# Patient Record
Sex: FEMALE | Race: OTHER | HISPANIC OR LATINO | Employment: FULL TIME | URBAN - METROPOLITAN AREA
[De-identification: names, ages, dates, MRNs, and addresses within clinical notes are randomized per-mention and may not be internally consistent; named-entity substitution may affect disease eponyms.]

---

## 2018-05-24 ENCOUNTER — OFFICE VISIT (OUTPATIENT)
Dept: FAMILY MEDICINE CLINIC | Facility: CLINIC | Age: 63
End: 2018-05-24
Payer: COMMERCIAL

## 2018-05-24 VITALS
SYSTOLIC BLOOD PRESSURE: 154 MMHG | HEIGHT: 59 IN | RESPIRATION RATE: 12 BRPM | TEMPERATURE: 98.4 F | DIASTOLIC BLOOD PRESSURE: 80 MMHG | BODY MASS INDEX: 26.61 KG/M2 | OXYGEN SATURATION: 98 % | WEIGHT: 132 LBS | HEART RATE: 63 BPM

## 2018-05-24 DIAGNOSIS — Z12.31 ENCOUNTER FOR SCREENING MAMMOGRAM FOR BREAST CANCER: Primary | ICD-10-CM

## 2018-05-24 DIAGNOSIS — Z13.1 SCREENING FOR DIABETES MELLITUS: ICD-10-CM

## 2018-05-24 DIAGNOSIS — Z12.11 ENCOUNTER FOR SCREENING COLONOSCOPY: ICD-10-CM

## 2018-05-24 DIAGNOSIS — K59.00 CONSTIPATION, UNSPECIFIED CONSTIPATION TYPE: ICD-10-CM

## 2018-05-24 PROCEDURE — 99203 OFFICE O/P NEW LOW 30 MIN: CPT | Performed by: FAMILY MEDICINE

## 2018-05-24 RX ORDER — POLYETHYLENE GLYCOL 3350 17 G/17G
17 POWDER, FOR SOLUTION ORAL DAILY
Qty: 14 EACH | Refills: 5 | Status: SHIPPED | OUTPATIENT
Start: 2018-05-24 | End: 2018-07-06 | Stop reason: SDUPTHER

## 2018-05-24 NOTE — PROGRESS NOTES
Assessment/Plan:    No problem-specific Assessment & Plan notes found for this encounter  Diagnoses and all orders for this visit:    Encounter for screening mammogram for breast cancer  -     Mammo screening bilateral w cad; Future  -     HEMOGLOBIN A1C W/ EAG ESTIMATION; Future  -     Lipid Panel with Direct LDL reflex; Future  -     Comprehensive metabolic panel; Future  -     TSH, 3rd generation with T4 reflex; Future  -     CBC and differential; Future    Constipation, unspecified constipation type  -     Ambulatory referral to Gastroenterology; Future  -     polyethylene glycol (MIRALAX) 17 g packet; Take 17 g by mouth daily  -     HEMOGLOBIN A1C W/ EAG ESTIMATION; Future  -     Lipid Panel with Direct LDL reflex; Future  -     Comprehensive metabolic panel; Future  -     TSH, 3rd generation with T4 reflex; Future  -     CBC and differential; Future    Encounter for screening colonoscopy  -     Ambulatory referral to Gastroenterology; Future  -     HEMOGLOBIN A1C W/ EAG ESTIMATION; Future  -     Lipid Panel with Direct LDL reflex; Future  -     Comprehensive metabolic panel; Future  -     TSH, 3rd generation with T4 reflex; Future  -     CBC and differential; Future    Screening for diabetes mellitus  -     HEMOGLOBIN A1C W/ EAG ESTIMATION; Future  -     Lipid Panel with Direct LDL reflex; Future  -     Comprehensive metabolic panel; Future  -     TSH, 3rd generation with T4 reflex; Future  -     CBC and differential; Future        get labs  Fu  6 mo     Subjective:   Pt here for an initial visit  Complaining of upset/gassy feeling when eating daily and red meats, pt has tried OTC meds for gases not helping     Patient ID: Jose A Clement is a 70 y o  female  HPI   New pt  Presents to establish care, she transfer from [facility], [place] but per my chart review she has not been seen there since 2015  HS of PND, headaches ( neg head ct in 2015)       She has been experiencing gasses overtime she eats  She also feels bloated  Denies pain or cramps or any other GI sxs  She admits sometime constipations, She has to be constantly be eating fruits  She only drinks one bottle of water a day  She takes no medicines  The following portions of the patient's history were reviewed and updated as appropriate: allergies, current medications, past family history, past medical history, past social history, past surgical history and problem list     Review of Systems   Constitutional: Negative for activity change and appetite change  Respiratory: Negative  Cardiovascular: Negative  Gastrointestinal: Negative  Genitourinary: Negative  Musculoskeletal: Negative for arthralgias  Skin: Negative for rash  Psychiatric/Behavioral: Negative  Objective:      /80   Pulse 63   Temp 98 4 °F (36 9 °C)   Resp 12   Ht 4' 11 06" (1 5 m)   Wt 59 9 kg (132 lb)   SpO2 98%   BMI 26 61 kg/m²          Physical Exam   Constitutional: She is oriented to person, place, and time  She appears well-developed and well-nourished  No distress  HENT:   Head: Normocephalic  Eyes: Conjunctivae are normal    Cardiovascular: Normal rate, regular rhythm and normal heart sounds  Pulmonary/Chest: Effort normal and breath sounds normal  No respiratory distress  She has no wheezes  She has no rales  Abdominal: Soft  Bowel sounds are normal  She exhibits no distension  There is no tenderness  Musculoskeletal: Normal range of motion  She exhibits no edema  Neurological: She is alert and oriented to person, place, and time  Psychiatric: She has a normal mood and affect   Her behavior is normal

## 2018-06-09 ENCOUNTER — HOSPITAL ENCOUNTER (OUTPATIENT)
Dept: RADIOLOGY | Age: 63
Discharge: HOME | End: 2018-06-09
Payer: COMMERCIAL

## 2018-06-09 ENCOUNTER — APPOINTMENT (OUTPATIENT)
Dept: LAB | Age: 63
End: 2018-06-09
Payer: COMMERCIAL

## 2018-06-09 DIAGNOSIS — K59.00 CONSTIPATION, UNSPECIFIED CONSTIPATION TYPE: ICD-10-CM

## 2018-06-09 DIAGNOSIS — Z12.11 ENCOUNTER FOR SCREENING COLONOSCOPY: ICD-10-CM

## 2018-06-09 DIAGNOSIS — Z13.1 SCREENING FOR DIABETES MELLITUS: ICD-10-CM

## 2018-06-09 DIAGNOSIS — Z12.31 ENCOUNTER FOR SCREENING MAMMOGRAM FOR BREAST CANCER: ICD-10-CM

## 2018-06-09 LAB
ALBUMIN SERPL BCP-MCNC: 3.9 G/DL (ref 3.5–5)
ALP SERPL-CCNC: 76 U/L (ref 46–116)
ALT SERPL W P-5'-P-CCNC: 20 U/L (ref 12–78)
ANION GAP SERPL CALCULATED.3IONS-SCNC: 5 MMOL/L (ref 4–13)
AST SERPL W P-5'-P-CCNC: 21 U/L (ref 5–45)
BASOPHILS # BLD AUTO: 0.04 THOUSANDS/ΜL (ref 0–0.1)
BASOPHILS NFR BLD AUTO: 1 % (ref 0–1)
BILIRUB SERPL-MCNC: 0.49 MG/DL (ref 0.2–1)
BUN SERPL-MCNC: 14 MG/DL (ref 5–25)
CALCIUM SERPL-MCNC: 9.1 MG/DL (ref 8.3–10.1)
CHLORIDE SERPL-SCNC: 107 MMOL/L (ref 100–108)
CHOLEST SERPL-MCNC: 214 MG/DL (ref 50–200)
CO2 SERPL-SCNC: 29 MMOL/L (ref 21–32)
CREAT SERPL-MCNC: 0.69 MG/DL (ref 0.6–1.3)
EOSINOPHIL # BLD AUTO: 0.1 THOUSAND/ΜL (ref 0–0.61)
EOSINOPHIL NFR BLD AUTO: 2 % (ref 0–6)
ERYTHROCYTE [DISTWIDTH] IN BLOOD BY AUTOMATED COUNT: 12.5 % (ref 11.6–15.1)
EST. AVERAGE GLUCOSE BLD GHB EST-MCNC: 120 MG/DL
GFR SERPL CREATININE-BSD FRML MDRD: 92 ML/MIN/1.73SQ M
GLUCOSE P FAST SERPL-MCNC: 87 MG/DL (ref 65–99)
HBA1C MFR BLD: 5.8 % (ref 4.2–6.3)
HCT VFR BLD AUTO: 42.6 % (ref 34.8–46.1)
HDLC SERPL-MCNC: 60 MG/DL (ref 40–60)
HGB BLD-MCNC: 14.1 G/DL (ref 11.5–15.4)
IMM GRANULOCYTES # BLD AUTO: 0.01 THOUSAND/UL (ref 0–0.2)
IMM GRANULOCYTES NFR BLD AUTO: 0 % (ref 0–2)
LDLC SERPL CALC-MCNC: 133 MG/DL (ref 0–100)
LYMPHOCYTES # BLD AUTO: 1.78 THOUSANDS/ΜL (ref 0.6–4.47)
LYMPHOCYTES NFR BLD AUTO: 39 % (ref 14–44)
MCH RBC QN AUTO: 29.4 PG (ref 26.8–34.3)
MCHC RBC AUTO-ENTMCNC: 33.1 G/DL (ref 31.4–37.4)
MCV RBC AUTO: 89 FL (ref 82–98)
MONOCYTES # BLD AUTO: 0.33 THOUSAND/ΜL (ref 0.17–1.22)
MONOCYTES NFR BLD AUTO: 7 % (ref 4–12)
NEUTROPHILS # BLD AUTO: 2.33 THOUSANDS/ΜL (ref 1.85–7.62)
NEUTS SEG NFR BLD AUTO: 51 % (ref 43–75)
NRBC BLD AUTO-RTO: 0 /100 WBCS
PLATELET # BLD AUTO: 249 THOUSANDS/UL (ref 149–390)
PMV BLD AUTO: 9.7 FL (ref 8.9–12.7)
POTASSIUM SERPL-SCNC: 3.6 MMOL/L (ref 3.5–5.3)
PROT SERPL-MCNC: 7.7 G/DL (ref 6.4–8.2)
RBC # BLD AUTO: 4.8 MILLION/UL (ref 3.81–5.12)
SODIUM SERPL-SCNC: 141 MMOL/L (ref 136–145)
TRIGL SERPL-MCNC: 104 MG/DL
TSH SERPL DL<=0.05 MIU/L-ACNC: 2.15 UIU/ML (ref 0.36–3.74)
WBC # BLD AUTO: 4.59 THOUSAND/UL (ref 4.31–10.16)

## 2018-06-09 PROCEDURE — 80061 LIPID PANEL: CPT

## 2018-06-09 PROCEDURE — 83036 HEMOGLOBIN GLYCOSYLATED A1C: CPT

## 2018-06-09 PROCEDURE — 77067 SCR MAMMO BI INCL CAD: CPT

## 2018-06-09 PROCEDURE — 36415 COLL VENOUS BLD VENIPUNCTURE: CPT

## 2018-06-09 PROCEDURE — 85025 COMPLETE CBC W/AUTO DIFF WBC: CPT

## 2018-06-09 PROCEDURE — 84443 ASSAY THYROID STIM HORMONE: CPT

## 2018-06-09 PROCEDURE — 80053 COMPREHEN METABOLIC PANEL: CPT

## 2018-07-06 ENCOUNTER — OFFICE VISIT (OUTPATIENT)
Dept: GASTROENTEROLOGY | Facility: MEDICAL CENTER | Age: 63
End: 2018-07-06
Payer: COMMERCIAL

## 2018-07-06 VITALS
HEART RATE: 61 BPM | DIASTOLIC BLOOD PRESSURE: 82 MMHG | WEIGHT: 134 LBS | HEIGHT: 59 IN | TEMPERATURE: 97.7 F | SYSTOLIC BLOOD PRESSURE: 136 MMHG | BODY MASS INDEX: 27.01 KG/M2

## 2018-07-06 DIAGNOSIS — R10.84 GENERALIZED ABDOMINAL PAIN: ICD-10-CM

## 2018-07-06 DIAGNOSIS — K59.00 CONSTIPATION, UNSPECIFIED CONSTIPATION TYPE: ICD-10-CM

## 2018-07-06 DIAGNOSIS — K21.9 GASTROESOPHAGEAL REFLUX DISEASE WITHOUT ESOPHAGITIS: Primary | ICD-10-CM

## 2018-07-06 DIAGNOSIS — Z86.010 HISTORY OF COLON POLYPS: ICD-10-CM

## 2018-07-06 DIAGNOSIS — Z12.11 ENCOUNTER FOR SCREENING COLONOSCOPY: ICD-10-CM

## 2018-07-06 DIAGNOSIS — R13.19 ESOPHAGEAL DYSPHAGIA: ICD-10-CM

## 2018-07-06 PROBLEM — Z86.0100 HISTORY OF COLON POLYPS: Status: ACTIVE | Noted: 2018-07-06

## 2018-07-06 PROCEDURE — 99244 OFF/OP CNSLTJ NEW/EST MOD 40: CPT | Performed by: INTERNAL MEDICINE

## 2018-07-06 RX ORDER — POLYETHYLENE GLYCOL 3350, SODIUM SULFATE, SODIUM CHLORIDE, POTASSIUM CHLORIDE, ASCORBIC ACID, SODIUM ASCORBATE 7.5-2.691G
2000 KIT ORAL ONCE
Qty: 2000 ML | Refills: 0 | Status: SHIPPED | OUTPATIENT
Start: 2018-07-06 | End: 2018-07-19

## 2018-07-06 RX ORDER — POLYETHYLENE GLYCOL 3350 17 G/17G
17 POWDER, FOR SOLUTION ORAL 2 TIMES DAILY
Qty: 60 EACH | Refills: 5 | Status: SHIPPED | OUTPATIENT
Start: 2018-07-06 | End: 2018-07-19

## 2018-07-06 NOTE — PROGRESS NOTES
[facility] - Outpatient Consultation  [facility] 70 [facility] MR[facility]: [id number]  Encounter: [id number]          ASSESSMENT AND PLAN:      1  Gastroesophageal reflux disease without esophagitis  2  Esophageal dysphagia  3  Generalized abdominal pain    I will schedule her for an upper endoscopy to evaluate her reflux and dysphagia and abdominal pain  She may have reflux esophagitis or a peptic stricture or Schatzki ring  Even if it appears endoscopically normal I will take random biopsies of the esophagus to rule out eosinophilic esophagitis and of the stomach to rule out Helicobacter pylori infection   - Case request operating room: EGD    4  Constipation, unspecified constipation type  5  Encounter for screening colonoscopy  6  History of colon polyps    I have asked her to continue taking the MiraLax twice a day for her constipation I will schedule her for colonoscopy since her last was about five years ago and given her history of colon polyps  - Case request operating room:  COLONOSCOPY  - MOVIPREP 100 g; Take 2,000 mL by mouth once for 1 dose  Dispense: 2000 mL; Refill: 0    ______________________________________________________________________    HPI:  She presents for evaluation of multiple gastrointestinal complaints  He he she has chronic reflux, abdominal pain, dysphagia, and constipation  Because of the symptoms, she was referred for an endoscopy and colonoscopy  She believes her last procedures were done about five years ago and that they were both unremarkable  She does have a prior history of colon polyps  She denies any family history of colon polyps or colon cancer  She has been taking MiraLax twice a day and feels it is helping  REVIEW OF SYSTEMS:    CONSTITUTIONAL: Denies any fever, chills, rigors, and weight loss  HEENT: No earache or tinnitus  Denies hearing loss or visual disturbances    CARDIOVASCULAR: No palpitations, but has occasional chest pain when cold  RESPIRATORY: Denies any cough, hemoptysis, shortness of breath or dyspnea on exertion  GASTROINTESTINAL: As noted in the History of Present Illness  GENITOURINARY: No problems with urination  Denies any hematuria or dysuria  NEUROLOGIC: No dizziness or vertigo, denies headaches  MUSCULOSKELETAL: Denies any muscle or joint pain  SKIN: Denies skin rashes or itching  ENDOCRINE: Denies excessive thirst  Denies intolerance to heat or cold  PSYCHOSOCIAL: Denies depression or anxiety  Denies any recent memory loss  Historical Information   Past Medical History:   Diagnosis Date    Frequent headaches      Past Surgical History:   Procedure Laterality Date     SECTION       Social History   History   Alcohol Use No     History   Drug Use No     History   Smoking Status    Never Smoker   Smokeless Tobacco    Never Used     No family history on file  Meds/Allergies       Current Outpatient Prescriptions:     polyethylene glycol (MIRALAX) 17 g packet    MOVIPREP 100 g    No Known Allergies        Objective     Blood pressure 136/82, pulse 61, temperature 97 7 °F (36 5 °C), temperature source Tympanic, height 4' 11 06" (1 5 m), weight 60 8 kg (134 lb)  Body mass index is 27 01 kg/m²  PHYSICAL EXAM:      General Appearance:   Alert, cooperative, no distress   HEENT:   Normocephalic, atraumatic, anicteric      Neck:  Supple, symmetrical, trachea midline   Lungs:   Clear to auscultation bilaterally; no rales, rhonchi or wheezing; respirations unlabored    Heart[de-identified]   Regular rate and rhythm; no murmur, rub, or gallop     Abdomen:   Soft, periumbilical tenderness, non-distended; normal bowel sounds; no masses, no organomegaly    Genitalia:   Deferred    Rectal:   Deferred    Extremities:  No cyanosis, clubbing or edema    Pulses:  2+ and symmetric    Skin:  No jaundice, rashes, or lesions    Lymph nodes:  No palpable cervical lymphadenopathy        Lab Results:   No visits with results within 1 Day(s) from this visit  Latest known visit with results is:   Appointment on 06/09/2018   Component Date Value    Hemoglobin A1C 06/09/2018 5 8     EAG 06/09/2018 120     Cholesterol 06/09/2018 214*    Triglycerides 06/09/2018 104     HDL, Direct 06/09/2018 60     LDL Calculated 06/09/2018 133*    Sodium 06/09/2018 141     Potassium 06/09/2018 3 6     Chloride 06/09/2018 107     CO2 06/09/2018 29     Anion Gap 06/09/2018 5     BUN 06/09/2018 14     Creatinine 06/09/2018 0 69     Glucose, Fasting 06/09/2018 87     Calcium 06/09/2018 9 1     AST 06/09/2018 21     ALT 06/09/2018 20     Alkaline Phosphatase 06/09/2018 76     Total Protein 06/09/2018 7 7     Albumin 06/09/2018 3 9     Total Bilirubin 06/09/2018 0 49     eGFR 06/09/2018 92     TSH 3RD GENERATON 06/09/2018 2 150     WBC 06/09/2018 4 59     RBC 06/09/2018 4 80     Hemoglobin 06/09/2018 14 1     Hematocrit 06/09/2018 42 6     MCV 06/09/2018 89     MCH 06/09/2018 29 4     MCHC 06/09/2018 33 1     RDW 06/09/2018 12 5     MPV 06/09/2018 9 7     Platelets 20/76/9532 249     nRBC 06/09/2018 0     Neutrophils Relative 06/09/2018 51     Immat GRANS % 06/09/2018 0     Lymphocytes Relative 06/09/2018 39     Monocytes Relative 06/09/2018 7     Eosinophils Relative 06/09/2018 2     Basophils Relative 06/09/2018 1     Neutrophils Absolute 06/09/2018 2 33     Immature Grans Absolute 06/09/2018 0 01     Lymphocytes Absolute 06/09/2018 1 78     Monocytes Absolute 06/09/2018 0 33     Eosinophils Absolute 06/09/2018 0 10     Basophils Absolute 06/09/2018 0 04          Radiology Results:   Mammo Screening Bilateral W Cad    Result Date: 6/14/2018  Narrative: Patient History: Patient had first child after 25  No known family history of cancer  No Hormone Replacement Therapy Patient has never smoked  Patient's BMI is 26 7  Reason for exam: screening, asymptomatic   Screening Mammo Screening Bilateral W CAD: June 9, 2018 - Check In #: [de-identified] Bilateral CC and MLO view(s) were taken  Technologist: RT Maira(R)(M) Prior study comparison: May 6, 2015, mammo screening bilateral W CAD, performed at [facility]  April 30, 2014, mammo screening bilateral W CAD, performed at [facility]  June 24, 2013, mammo diagnostic right W CAD, performed at [facility]  May 1, 2013, US breast right, performed at [facility]  December 17, 2012, mammo diagnostic right W CAD, performed at [facility]  November 26, 2012, mammo screening bilateral W CAD, performed at [facility]  July 13, 2011, mammo screening bilateral W CAD, performed at [facility]  There are scattered fibroglandular densities  Small nodular asymmetry along the nipple line on the right cc view was present on exam from 2014 and has not significantly enlarged suggesting benign etiology  There are no spiculated masses on either side  There are no malignant calcifications  No skin thickening or nipple inversion or axillary adenopathy  ACR BI-RADS® Assessments: BiRad:2 - Benign Recommendation: Routine screening mammogram of both breasts in 1 year  Analyzed by CAD The patient is scheduled in a reminder system for screening mammography  8-10% of cancers will be missed on mammography  Management of a palpable abnormality must be based on clinical grounds  Patients will be notified of their results via letter from our facility  Accredited by [address] [place] [address] of Radiology and FDA   Transcription Location: [address] [place] [address] [place]: [id number] Risk Value(s): Tyrer-Cuzick 10 Year: 2 900%, Tyrer-Cuzick Lifetime: 6 000%, Myriad Table: 1 5%, DANIELLE 5 Year: 2 1%, NCI Lifetime: 7 8%

## 2018-07-06 NOTE — PATIENT INSTRUCTIONS
Pt was scheduled for a colon/egd at [facility] on 8/28/18 with Dr Nilda Castillo instructions were given to the pt and gone over by the MA

## 2018-07-06 NOTE — LETTER
July 6, 2018     Charisma Muñiz MD  [address]  [address]  [address] [place] [address]    Patient: Alanna Childers   YOB: 1955   Date of Visit: 7/6/2018       Dear Dr Raphael Lomas: Thank you for referring Alanna Childers to me for evaluation  Below are my notes for this consultation  If you have questions, please do not hesitate to call me  I look forward to following your patient along with you           Sincerely,        Verito Barrientos MD        CC: No Recipients

## 2018-07-10 DIAGNOSIS — Z00.00 HEALTH MAINTENANCE EXAMINATION: Primary | ICD-10-CM

## 2018-07-19 ENCOUNTER — OFFICE VISIT (OUTPATIENT)
Dept: URGENT CARE | Facility: MEDICAL CENTER | Age: 63
End: 2018-07-19
Payer: COMMERCIAL

## 2018-07-19 VITALS
DIASTOLIC BLOOD PRESSURE: 71 MMHG | SYSTOLIC BLOOD PRESSURE: 157 MMHG | OXYGEN SATURATION: 97 % | RESPIRATION RATE: 20 BRPM | HEIGHT: 64 IN | WEIGHT: 129 LBS | BODY MASS INDEX: 22.02 KG/M2 | HEART RATE: 62 BPM | TEMPERATURE: 97.8 F

## 2018-07-19 DIAGNOSIS — M79.651 RIGHT THIGH PAIN: ICD-10-CM

## 2018-07-19 DIAGNOSIS — H57.11 ACUTE RIGHT EYE PAIN: Primary | ICD-10-CM

## 2018-07-19 PROCEDURE — 99284 EMERGENCY DEPT VISIT MOD MDM: CPT | Performed by: FAMILY MEDICINE

## 2018-07-19 PROCEDURE — G0383 LEV 4 HOSP TYPE B ED VISIT: HCPCS | Performed by: FAMILY MEDICINE

## 2018-07-19 RX ORDER — CYCLOBENZAPRINE HCL 5 MG
10 TABLET ORAL 3 TIMES DAILY PRN
Qty: 30 TABLET | Refills: 0 | Status: SHIPPED | OUTPATIENT
Start: 2018-07-19 | End: 2019-03-18 | Stop reason: ALTCHOICE

## 2018-07-19 NOTE — PATIENT INSTRUCTIONS
Right and left thigh are injected  I suspect elevated pressure in the eyes  However unable to test ocular pressure  Patient be referred to [facility] for sight for ophthalmology consult  For right thigh pain, I prescribed cyclobenzaprine 5 mg q h s  p r n     Advised patient apply warm compress to affected area and to follow up with primary care provider  Dolor en el orlando  LO QUE NECESITA SABER:   El dolor de orlando podría ser a causa de problemas dentro de dean orlando  Un problema o condición en otra parte del cuerpo también puede provocar que el dolor se transmita al orlando  Algunas causas del dolor de orlando incluyen ojos resecos, inflamación, infección de sinusitis o cefaleas en brotes  INSTRUCCIONES SOBRE EL REN HOSPITALARIA:   Medicamentos:  Es posible que usted necesite alguno de los siguientes:  · Narcisa Nunnery artificiales  son gotas que pueden ayudar a humedecer ashli ojos y aliviar el dolor  Pregúntele a dean médico la frecuencia con la que usted debe usar las lágrimas artificiales  · AINEs (Analgésicos antiinflamatorios no esteroides) luis el ibuprofeno, ayudan a disminuir la inflamación, el dolor y la [place]  Brandee medicamento esta disponible con o sin ko receta médica  Los AINEs pueden causar sangrado estomacal o problemas renales en ciertas personas  Si usted esta tomando un anticoágulante,  siempre  pregunte si los AINEs son seguros para usted  Siempre nicholas la etiqueta de brandee medicamento y Felix Valenzuela instrucciones  No administre brandee medicamento a niños menores de 6 meses de deena sin antes obtener la autorización de dean médico      · [place] ashli medicamentos luis se le haya indicado  Consulte con dean médico si usted sonia que dean medicamento no le está ayudando o si presenta efectos secundarios  Infórmele si es alérgico a algún medicamento  Mantenga ko lista actualizada de los [place], las vitaminas y los productos herbales que avel   Incluya los siguientes datos de los medicamentos: cantidad, frecuencia y motivo de administración  Traiga con usted la lista o los envases de la píldoras a aniyah citas de seguimiento  Lleve la lista de los medicamentos con usted en gen de ko emergencia  Acuda a aniyah consultas de control con whittaker médico según le indicaron  A usted podrían referirlo a un especialista de los ojos  Anote aniyah preguntas para que se acuerde de hacerlas oscar aniyah visitas  Pregúntele a whittaker [employer] vitaminas y minerales son adecuados para usted  · Usted tiene fiebre  · Whittaker dolor de orlando empeora cuando usted mueve aniyah ojos  · Usted tiene preguntas o inquietudes acerca de whittaker condición o cuidado  Regrese a la baudilio de emergencias si:   · Usted tiene alguna pérdida de visión  · Usted tiene cambios en la visión repentinos, luis visión borrosa, doble o ve halos alrededignacio Bonilla  · Usted desarrolla un dolor de [place] severo  © 2017 26[address] Andrew Gomez Information is for End User's use only and may not be sold, redistributed or otherwise used for commercial purposes  All illustrations and images included in CareNotes® are the copyrighted property of A D A M , Inc  or Julio Cesar Smith  Esta información es sólo para uso en educación  Whittaker intención no es darle un consejo médico sobre enfermedades o tratamientos  Colsulte con whittaker [place] farmacéutico antes de seguir cualquier régimen médico para saber si es seguro y efectivo para usted  Dolor de piernas   LO QUE NECESITA SABER:   El dolor de piernas puede ser causado por ko variedad de afecciones de [place]  Aniyah exámenes no mostraron ningún hueso roto ni coágulos de [place]  INSTRUCCIONES SOBRE EL REN HOSPITALARIA:   Regrese a la baudilio de emergencias si:   · Usted tiene fiebre  · Whittaker pierna empieza a inflamarse    · Whittaker dolor de [place]  · Usted tiene entumecimiento o cosquilleo en la pierna o los dedos del pie  · Usted es incapaz de  o soportar nada de peso sobre whittaker pierna    Pregúntele a whittaker [employer] vitaminas y minerales son adecuados para usted  · [place] dolor no disminuye, aún después del [place]  · Usted tiene preguntas o inquietudes acerca de dean condición o cuidado  Medicamentos:   · AINEs (Analgésicos antiinflamatorios no esteroides) luis el ibuprofeno, ayudan a disminuir la inflamación, el dolor y la fiebre  Brandee medicamento esta disponible con o sin ko receta médica  Los AINEs pueden causar sangrado estomacal o problemas renales en ciertas personas  Si usted avel un medicamento anticoagulante, siempre pregúntele a dean médico si los MEERA son seguros para usted  Siempre nicholas la etiqueta de brandee medicamento y [place] instrucciones  · [place] ashli medicamentos luis se le haya indicado  Consulte con dean médico si usted sonia que dean medicamento no le está ayudando o si presenta efectos secundarios  Infórmele si es alérgico a algún medicamento  Mantenga ko lista actualizada de los [place], las vitaminas y los productos herbales que avel  Incluya los siguientes datos de los medicamentos: cantidad, frecuencia y motivo de administración  Traiga con usted la lista o los envases de la píldoras a ashli citas de seguimiento  Lleve la lista de los medicamentos con usted en gen de ko emergencia  Acuda a ashli consultas de control con dean médico según le indicaron  Es posible que necesite más exámenes para determinar la causa de dean dolor de pierna  Usted podría necesitar fisioterapia o consultar con un ortopedista especializado  Anote ashli preguntas para que se acuerde de hacerlas oscar ashli visitas  Lidiar con el dolor de pierna:   · Descanse  dean pierna para que se recupere  Es posible que necesite un inmovilizador, aparato ortopédico o férula para limitar el movimiento de la pierna  Posiblemente deba evitar poner peso sobre dean pierna oscar al menos 48 horas  Regrese a ashli actividades cotidianas según las indicaciones      · El hielo  al área afectada por 20 minutos cada 4 horas por un jennie de 24 horas o según las indicaciones  Use un paquete de hielo o ponga hielo molido dentro de [employer]  Cúbrala con ko toalla para proteger whittaker piel  El hielo ayuda a evitar daño al tejido y a disminuir la inflamación y el dolor  · Eleve  whittaker pierna lesionada por encima del nivel de whittaker corazón con la frecuencia que pueda  Foristell va a disminuir inflamación y el dolor  Apoye la pierna sobre almohadas o mantas para mantener el área elevada de ko forma cómoda, si es posible  · Use dispositivos de apoyo luis se le indique  Es posible que usted necesite usar un bastón o [place]  Los dispositivos de asistencia pueden ayudar a disminuir el dolor y la presión que se ejerce en whittaker pierna al caminar  Pregunte a whittaker médico por más [employer] dispositivos de asistencia y cómo se usan de forma correcta  · Mantenga un peso saludable  El peso corporal adicional puede provocar presión y dolor en las articulaciones de whittaker Lynnetta Imus y tobillo  Consulte con whittaker médico cuánto debería pesar  Pida que le ayude a crear un plan para bajar de peso si usted tiene sobrepeso  © 2017 2600 Andrew Gomez Information is for End User's use only and may not be sold, redistributed or otherwise used for commercial purposes  All illustrations and images included in CareNotes® are the copyrighted property of A D A M , Inc  or Julio Cesar Smith  Esta información es sólo para uso en educación  Whittaker intención no es darle un consejo médico sobre enfermedades o tratamientos  Colsulte con whittaker [place] farmacéutico antes de seguir cualquier régimen médico para saber si es seguro y efectivo para usted  Eye Pain   WHAT YOU NEED TO KNOW:   What causes eye pain? Eye pain may be caused by a problem within your eye  A problem or condition in another body area can also cause pain that travels to your eye   Eye pain may be caused by any of the following:  · Dry eyes     · An abrasion on your cornea (the surface of your eye) · A foreign body in your eye     · Inflammation of a nerve, gland, or muscle in your eye    · Certain types of glaucoma (increased pressure inside your eye that can cause vision loss)     · A sinus infection or jaw pain    · Headaches, including migraine or cluster headaches  How is the cause of eye pain diagnosed? Your healthcare provider will examine your eyes and ask when your pain began  He will also ask if you have other symptoms, such as sensitivity to light  Tell him if you ever had eye surgery or an eye injury  Tell him if you wear glasses or contact lenses  Also tell him the names of medicines you take, and if you have allergies or health conditions  You may need the following tests:  · A visual acuity test  checks your vision in both eyes  You will be asked to read letters and numbers from a chart  · A slit-lamp exam  uses a microscope to check every part of your eye for inflammation or injury  A dye may be used to look for damage to your cornea  · A fluorescein stain test  uses dye to show if you have a foreign body in your eye  It can also reveal damage to your cornea  · A tonometry test  measures the pressure inside your eye to check for glaucoma  Your healthcare provider will numb your eyes with eyedrops before he checks your eye pressure  How is eye pain treated? · Artificial tears  are eyedrops that can help moisturize your eyes and relieve your pain  Ask your healthcare provider how often to use artificial tears  · NSAIDs , such as ibuprofen, help decrease swelling, pain, and fever  This medicine is available with or without a doctor's order  NSAIDs can cause stomach bleeding or kidney problems in certain people  If you take blood thinner medicine, always ask if NSAIDs are safe for you  Always read the medicine label and follow directions  Do not give these medicines to children under 10months of age without direction from your child's healthcare provider    When should I contact my healthcare provider? · You have a fever  · Your eye pain gets worse when you move your eyes  · You have questions or concerns about your condition or care  When should I seek immediate care or call 911? · You have any vision loss  · You have sudden vision changes such as blurred vision, double vision, or seeing halos around lights  · You develop severe eye pain  CARE AGREEMENT:   You have the right to help plan your care  Learn about your health condition and how it may be treated  Discuss treatment options with your caregivers to decide what care you want to receive  You always have the right to refuse treatment  The above information is an  only  It is not intended as medical advice for individual conditions or treatments  Talk to your doctor, nurse or pharmacist before following any medical regimen to see if it is safe and effective for you  © 2017 2600 Andrew  Information is for End User's use only and may not be sold, redistributed or otherwise used for commercial purposes  All illustrations and images included in CareNotes® are the copyrighted property of A D A M , Inc  or Julio Cesar Smith

## 2018-07-19 NOTE — PROGRESS NOTES
[facility] [employer] [facility] [employer] [facility]        NAME: Daily Connor is a 70 y o  female  : 1955    MRN: [id number]  DATE: 2018  TIME: 6:03 PM    Assessment and Plan   Acute right eye pain [H57 11]  1  Acute right eye pain  Ambulatory referral to Ophthalmology   2  Right thigh pain  cyclobenzaprine (FLEXERIL) 5 mg tablet         Patient Instructions       Follow up with PCP in 3-5 days  Proceed to  ER if symptoms worsen  Chief Complaint     Chief Complaint   Patient presents with    Eye Pain     right eye pain no injury  right knee pain 1 week no injury         History of Present Illness       Patient here today because of 2 problems  First, she is complaining of severe right eye pain which began yesterday in the evening  It was also accompanied by intense redness of the eye  Denies any visual disturbance  Pain has diminished  She had similar problem 6 months ago  She informs me she has been seen by an optometrist 6 months ago refer to on ophthalmologist for unknown diagnosis  She cannot recall the diagnosis at that time  I asked her about glaucoma however she is not sure  Denies any nausea or vomiting or headache  She is also here today because of right knee pain for the past week  Denies any fall or trauma  Upon further questioning, pain is actually located in right right thigh  Denies any swelling or bruising  She found it difficult to bend her knee last week  She Has been applying topical ointment for her knee with some mild improvement  Review of Systems   Review of Systems   Constitutional: Negative  Eyes: Positive for pain and redness  Negative for photophobia and visual disturbance  Musculoskeletal: Positive for arthralgias and myalgias  Negative for joint swelling           Current Medications       Current Outpatient Prescriptions:     cyclobenzaprine (FLEXERIL) 5 mg tablet, Take 2 tablets (10 mg total) by mouth 3 (three) times a day as needed for muscle spasms, Disp: 30 tablet, Rfl: 0    Current Allergies     Allergies as of 2018    (No Known Allergies)            The following portions of the patient's history were reviewed and updated as appropriate: allergies, current medications, past family history, past medical history, past social history, past surgical history and problem list      Past Medical History:   Diagnosis Date    Frequent headaches        Past Surgical History:   Procedure Laterality Date     SECTION         No family history on file  Medications have been verified  Objective   /71   Pulse 62   Temp 97 8 °F (36 6 °C)   Resp 20   Ht 5' 4" (1 626 m)   Wt 58 5 kg (129 lb)   SpO2 97%   BMI 22 14 kg/m²        Physical Exam     Physical Exam   Eyes: EOM are normal  Pupils are equal, round, and reactive to light  Right and left thigh reveals injected sclera bilaterally  There is tenderness upon palpation over the right and left lobe  Funduscopic exam reveals no papilledema hemorrhage or exudate  Musculoskeletal: Normal range of motion  She exhibits tenderness  Right lower extremity reveals full range on flexion and extension at the knee  Lachman's test-negative  Kiko's test is negative  There is no evidence of peripatellar effusion  There is point tenderness of the distal right upper thigh with no evidence of ecchymosis or effusion  Leg strength bilaterally, good, 5/5  Nursing note and vitals reviewed

## 2018-08-08 ENCOUNTER — TELEPHONE (OUTPATIENT)
Dept: GASTROENTEROLOGY | Facility: CLINIC | Age: 63
End: 2018-08-08

## 2018-08-08 NOTE — TELEPHONE ENCOUNTER
Spoke to Sincere [place] @ [facility] authorization [phone] faxed clinicals   Pending [facility]#[id number]

## 2018-08-27 ENCOUNTER — ANESTHESIA EVENT (OUTPATIENT)
Dept: GASTROENTEROLOGY | Facility: MEDICAL CENTER | Age: 63
End: 2018-08-27
Payer: COMMERCIAL

## 2018-08-28 ENCOUNTER — HOSPITAL ENCOUNTER (OUTPATIENT)
Facility: MEDICAL CENTER | Age: 63
Setting detail: OUTPATIENT SURGERY
Discharge: HOME | End: 2018-08-28
Attending: INTERNAL MEDICINE | Admitting: INTERNAL MEDICINE
Payer: COMMERCIAL

## 2018-08-28 ENCOUNTER — ANESTHESIA (OUTPATIENT)
Dept: GASTROENTEROLOGY | Facility: MEDICAL CENTER | Age: 63
End: 2018-08-28
Payer: COMMERCIAL

## 2018-08-28 VITALS
RESPIRATION RATE: 16 BRPM | SYSTOLIC BLOOD PRESSURE: 106 MMHG | HEIGHT: 64 IN | TEMPERATURE: 97.9 F | DIASTOLIC BLOOD PRESSURE: 56 MMHG | BODY MASS INDEX: 20.83 KG/M2 | WEIGHT: 122 LBS | OXYGEN SATURATION: 96 % | HEART RATE: 62 BPM

## 2018-08-28 DIAGNOSIS — Z86.010 HISTORY OF COLON POLYPS: ICD-10-CM

## 2018-08-28 DIAGNOSIS — R10.84 GENERALIZED ABDOMINAL PAIN: ICD-10-CM

## 2018-08-28 DIAGNOSIS — R13.19 ESOPHAGEAL DYSPHAGIA: ICD-10-CM

## 2018-08-28 DIAGNOSIS — K21.9 GASTROESOPHAGEAL REFLUX DISEASE WITHOUT ESOPHAGITIS: ICD-10-CM

## 2018-08-28 DIAGNOSIS — K59.00 CONSTIPATION, UNSPECIFIED CONSTIPATION TYPE: ICD-10-CM

## 2018-08-28 PROCEDURE — 88305 TISSUE EXAM BY PATHOLOGIST: CPT | Performed by: PATHOLOGY

## 2018-08-28 PROCEDURE — 43239 EGD BIOPSY SINGLE/MULTIPLE: CPT | Performed by: INTERNAL MEDICINE

## 2018-08-28 PROCEDURE — G0121 COLON CA SCRN NOT HI RSK IND: HCPCS | Performed by: INTERNAL MEDICINE

## 2018-08-28 RX ORDER — LIDOCAINE HYDROCHLORIDE 20 MG/ML
INJECTION, SOLUTION EPIDURAL; INFILTRATION; INTRACAUDAL; PERINEURAL AS NEEDED
Status: DISCONTINUED | OUTPATIENT
Start: 2018-08-28 | End: 2018-08-28 | Stop reason: SURG

## 2018-08-28 RX ORDER — PROPOFOL 10 MG/ML
INJECTION, EMULSION INTRAVENOUS AS NEEDED
Status: DISCONTINUED | OUTPATIENT
Start: 2018-08-28 | End: 2018-08-28 | Stop reason: SURG

## 2018-08-28 RX ORDER — PROPOFOL 10 MG/ML
INJECTION, EMULSION INTRAVENOUS CONTINUOUS PRN
Status: DISCONTINUED | OUTPATIENT
Start: 2018-08-28 | End: 2018-08-28 | Stop reason: SURG

## 2018-08-28 RX ORDER — SODIUM CHLORIDE 9 MG/ML
125 INJECTION, SOLUTION INTRAVENOUS CONTINUOUS
Status: DISCONTINUED | OUTPATIENT
Start: 2018-08-28 | End: 2018-08-28 | Stop reason: HOSPADM

## 2018-08-28 RX ADMIN — LIDOCAINE HYDROCHLORIDE 60 MG: 20 INJECTION, SOLUTION EPIDURAL; INFILTRATION; INTRACAUDAL; PERINEURAL at 09:07

## 2018-08-28 RX ADMIN — PROPOFOL 110 MG: 10 INJECTION, EMULSION INTRAVENOUS at 09:07

## 2018-08-28 RX ADMIN — PROPOFOL 180 MCG/KG/MIN: 10 INJECTION, EMULSION INTRAVENOUS at 09:13

## 2018-08-28 RX ADMIN — PROPOFOL 20 MG: 10 INJECTION, EMULSION INTRAVENOUS at 09:11

## 2018-08-28 RX ADMIN — PROPOFOL 20 MG: 10 INJECTION, EMULSION INTRAVENOUS at 09:09

## 2018-08-28 RX ADMIN — SODIUM CHLORIDE 125 ML/HR: 0.9 INJECTION, SOLUTION INTRAVENOUS at 08:57

## 2018-08-28 NOTE — OP NOTE
OPERATIVE REPORT  PATIENT NAME: Spencer Martinez    :  1955  MRN: [id number]  Pt Location: [facility] GI ROOM 01    SURGERY DATE: 2018    Surgeon(s) and Role:     * Nhan Patel MD - Primary     ESOPHAGOGASTRODUODENOSCOPY(EGD) and COLONOSCOPY    PROCEDURE: EGD    SEDATION: Monitored anesthesia care, check anesthesia records    ASA Class: 2    INDICATIONS:  Dysphagia, reflux, abdominal pain, history of colon polyps, and constipation    CONSENT:  Informed consent was obtained for the procedure, including sedation after explaining the risks and benefits of the procedure  Risks including but not limited to bleeding, perforation, infection, and missed lesion  PREPARATION:   Telemetry, pulse oximetry, blood pressure were monitored throughout the procedure  Patient was identified by myself both verbally and by visual inspection of ID band  DESCRIPTION:   Patient was placed in the left lateral decubitus position and was sedated with the above medication  The gastroscope was introduced in to the oropharynx and the esophagus was intubated under direct visualization  Scope was passed down the esophagus up to 2nd part of the duodenum  A careful inspection was made as the gastroscope was withdrawn, including a retroflexed view of the stomach; findings and interventions are described below  FINDINGS:    #1  Esophagus- normal  Random biopsies were taken from the proximal esophagus to rule out eosinophilic esophagitis  #2  Stomach- there was a small sliding hiatal hernia  #3  Duodenum- normal             PROCEDURE: COLONOSCOPY      DESCRIPTION:     Prior colonoscopy: 5 years ago  Informed consent was obtained for the procedure, including sedation  Risks of perforation, hemorrhage, adverse drug reaction and aspiration were discussed  The patient was placed in the left lateral decubitus position    Based on the pre-procedure assessment, including review of the patient's medical history, medications, allergies, and review of systems, she had been deemed to be an appropriate candidate for conscious sedation; she was therefore sedated with the medications listed below  The patient was monitored continuously with telemetry, pulse oximetry, blood pressure monitoring, and direct observations  A rectal examination was performed  The variable-stiffness pediatric colonoscope was inserted into the rectum and advanced under direct vision to the terminal ileum  The quality of the colonic preparation was good  A careful inspection was made as the colonoscope was withdrawn, including a retroflexed view of the rectum; findings and interventions are described below  FINDINGS:  The terminal ileum and colon were normal including the retroflexed view of the rectum  COMPLICATIONS:   None; patient tolerated the procedure well  IMPRESSION:    Normal colonoscopy to the terminal ileum  RECOMMENDATIONS:  Repeat colonoscopy in 5 years or sooner if clinically indicated  Repeat colonoscopy is being recommended at an interval of less than 10 years, this is because of a personal history of polyps or colon cancer    Follow up with endoscopist         SIGNATURE: oTrie Silva MD  DATE: August 28, 2018  TIME: 9:14 AM

## 2018-08-28 NOTE — H&P
History and Physical - [facility]  KALA MOISE 70 y o  female MRN: [id number]                  HPI: KALA SHELDON ALLEGIANCE CARELINK is a 70y o  year old female who presents for:    1  Gastroesophageal reflux disease without esophagitis  2  Esophageal dysphagia  3  Generalized abdominal pain  4  Constipation, unspecified constipation type  5  Encounter for screening colonoscopy  6  History of colon polyps      REVIEW OF SYSTEMS: Per the HPI, and otherwise unremarkable  Historical Information   Past Medical History:   Diagnosis Date    Colon polyp     Frequent headaches      Past Surgical History:   Procedure Laterality Date     SECTION      EGD AND COLONOSCOPY       Social History   History   Alcohol Use    Yes     History   Drug Use No     History   Smoking Status    Never Smoker   Smokeless Tobacco    Never Used     History reviewed  No pertinent family history  Meds/Allergies     Prescriptions Prior to Admission   Medication    cyclobenzaprine (FLEXERIL) 5 mg tablet       No Known Allergies    Objective     Blood pressure 151/70, pulse 77, temperature 97 9 °F (36 6 °C), temperature source Temporal, resp  rate 16, height 5' 3 75" (1 619 m), weight 55 3 kg (122 lb), SpO2 99 %  PHYSICAL EXAM    Gen: NAD  CV: RRR  CHEST: Clear  ABD: soft, NT/ND  EXT: no edema      ASSESSMENT/PLAN:  This is a 70y o  year old female here for upper endoscopy and colonoscopy, and she is stable and optimized for her procedure

## 2018-08-28 NOTE — ANESTHESIA PREPROCEDURE EVALUATION
Review of Systems/Medical History  Patient summary reviewed  Chart reviewed      Cardiovascular   Pulmonary  Negative pulmonary ROS        GI/Hepatic  Dysphagia,   GERD , Bowel prep            Endo/Other  Negative endo/other ROS      GYN       Hematology  Negative hematology ROS      Musculoskeletal  Negative musculoskeletal ROS        Neurology    Headaches,    Psychology   Negative psychology ROS              Physical Exam    Airway    Mallampati score: I  TM Distance: <3 FB  Neck ROM: full     Dental   No notable dental hx     Cardiovascular  Rhythm: regular, Rate: normal, Cardiovascular exam normal    Pulmonary  Pulmonary exam normal     Other Findings        Anesthesia Plan  ASA Score- 2     Anesthesia Type- IV sedation with anesthesia with ASA Monitors  Additional Monitors:   Airway Plan:         Plan Factors- Patient instructed to abstain from smoking on day of procedure  Patient did not smoke on day of surgery  Induction- intravenous  Postoperative Plan-     Informed Consent- Anesthetic plan and risks discussed with patient

## 2018-08-28 NOTE — DISCHARGE INSTRUCTIONS
FINDINGS:     #1  Esophagus- normal  Random biopsies were taken from the proximal esophagus to rule out eosinophilic esophagitis      #2  Stomach- there was a small sliding hiatal hernia      #3  Duodenum- normal            IMPRESSION:    Normal colonoscopy to the terminal ileum      RECOMMENDATIONS:  Repeat colonoscopy in 5 years or sooner if clinically indicated  Repeat colonoscopy is being recommended at an interval of less than 10 years, this is because of a personal history of polyps or colon cancer  Follow up with endoscopist        Endoscopia superior   LO QUE NECESITA SABER:   Ko endoscopia superior también se conoce luis endoscopia gastrointestinal (GI) superior o esofagogastroduodenoscopia (EGD)  Usted podría sentirse inflamado, con gases o sentir molestia abdominal después de dean procedimiento  Dean garganta podría estar adolorida por 24 a 36 horas  Usted podría eructar o expulsar gas debido al aire que todavía está en dean cuerpo  INSTRUCCIONES SOBRE EL REN HOSPITALARIA:   Llame al 911 si presenta:   · Tiene dolor en el pecho o dificultad para respirar de forma repentina  Busque atención médica de inmediato si:   · Está mareado o siente que se va a desmayar  · Usted tiene dificultad para tragar  · Usted tiene dolor de garganta intenso  · Aniyah evacuaciones intestinales son Rosetta Bourgeois o negras  · Dean abdomen está piper y firme y usted siente dolor intenso  · Usted vomita adelaida  Pregúntele a dean [facility] vitaminas y minerales son adecuados para usted  · Usted se siente lleno o hinchado y no puede eructar o expulsar gas  · Usted no ha tenido ko evacuación intestinal después de 3 días de dean procedimiento  · Usted tiene dolor de ansley  · Usted tiene fiebre o escalofríos  · Usted tiene náuseas o está vomitando  · Usted tiene salpullido o urticaria  · Usted tiene preguntas o inquietudes acerca de dean endoscopia    Alivie el dolor de garganta:  Chupe pastanabels para la garganta o hielo triturado  Yanci gárgaras con ko pequeña cantidad de agua tibia con sal  Mezcle 1 cucharadita de sal y 1 taza de agua tibia para hacer agua salada  Alivie el gas y la molestia de la inflamación:  Acuéstese sobre whittaker costado derecho con ko almohada térmica sobre whittaker abdomen  Camine un poco para ayudar a expulsar el gas  Coma comidas pequeñas hasta que se alivie de la inflamación  Descanse después de whittaker procedimiento:  No maneje o tome decisiones importantes hasta el día siguiente de whittaker procedimiento  Regrese a ashli actividades normales según le indiquen  Usted generalmente puede regresar al Junior Hartman al día siguiente de whittaker procedimiento  Acuda a ashli consultas de control con whittaker médico según le indicaron  Anote ashli preguntas para que se acuerde de hacerlas oscar ashli visitas  © 2017 [facility] Information is for End User's use only and may not be sold, redistributed or otherwise used for commercial purposes  All illustrations and images included in CareNotes® are the copyrighted property of A D A M , Inc  or Julio Cesar Smith  Esta información es sólo para uso en educación  Whittaker intención no es darle un consejo médico sobre enfermedades o tratamientos  Colsulte con whittaker Stormy Binder farmacéutico antes de seguir cualquier régimen médico para saber si es seguro y efectivo para usted  Colonoscopia   LO QUE NECESITA SABER:   ¿Qué necesito saber sobre ko colonoscopia? Ko colonoscopia es un procedimiento para examinar con un endoscopio el interior de whittaker colon (intestino)  La sonda es un tubo flexible con ko melina sofy y Thompson en la punta  Oscar ko colonoscopia, es posible que le retiren pólipos o crecimientos de tejidos  ¿Qué cara hacer la semana antes de la colonoscopia? Usted necesitará dejar de loan los medicamentos que contienen aspirina o isi oscar 7 días antes de whittaker colonoscopia   Si usted avel anticoagulantes, luis warfarina, pregunte cuándo debería dejar de tomarlos  Póngase de acuerdo con alguien para que lo lleve a dean casa después del procedimiento  ¿Cómo me preparo para la colonoscopia? Dean médico le pedirá que prepare aniyah intestinos antes de dean procedimiento  Aniyah intestinos necesitarán estar vacíos antes de dean procedimiento para permitirle que dieudonne dean colon claramente  Usted necesitará hacer lo siguiente el día antes de dean procedimiento:  · Solo tome líquidos dayanara  todo el día antes de dean colonoscopia  La dieta de líquidos dayanara incluye jugos de fruta y caldos livianos, gelatina saborizada Delman Karly y caramelos duros  También incluye café, té, bebidas gaseosas y bebidas deportivas claras  · Siga la preparación de aniyah intestinos luis se le indicó  Existen diferentes preparaciones que le pueden harriett para antes de ko colonoscopia  Algunas se administran por 2 horas y otras por más de 6 horas  Algunas se administran temprano en la tarde del día anterior a la colonoscopía  Otras se administran el día antes y luego en la mañana de la colonoscopia  Con cualquier preparación de intestinos, permanezca cerca del baño  Brandee líquido le provocará que tenga evacuaciones intestinales frecuentemente  · Un enema  podría ser necesaria  Dean médico podría indicarle que use un enema para limpiar aniyah intestinos  · No coma o tome nada después de la medianoche  [place] ayudará a evitar problemas que pueden suceder si usted vomita mientras está bajo anestesia  ¿Qué sucederá oscar mi colonoscopia? · Colen Moons medicamento para ayudarlo a relajarse  Se recostará sobre el costado param y [address] dean pecho  Dean médico le examinará el ano y utilizará un dedo para revisar dean recto  Si dean intestino no está vacío le pueden administrar otro enema  El colonoscopio se Amedeo Burdock y se colocará suavemente en dean ano  Luego se pasará por el recto hacia el colon   Inocente [place] agua o aire en dean colon para ayudar a limpiarlo o ensancharlo  [place] lo realizará dean médico para poder observar con claridad dean colon  · Se pueden loan muestras de tejido de las barahona de dean intestino y enviarlas al laboratorio para ser analizadas  En gen de tener pólipos, dean médico utiliza un alambre con ko argolla al final que pasará por el interior del endoscopio y lo usará para sostener el pólipo  Luego el pólipo será quemado o cortado de la pared del colon  Los pólipos extraídos serán enviados al laboratorio para ser [employer]  Le pueden loan imágenes del colon oscar el procedimiento  El endoscopio será retirado cuando el procedimiento se termine  ¿Qué sucederá después de mi colonoscopia? · Descanse después de dean procedimiento  Usted podría sentirse inflamado, tener gases y [place] abdominal  Es posible que necesite acostarse sobre dean costado derecho con ko almohada térmica sobre dean abdomen  Posiblemente necesite caminar un poco para ayudarse a expulsar los gases  Si se siente inflamado, coma comidas pequeñas  No maneje o tome decisiones importantes hasta el día siguiente de dean procedimiento  · Es posible que le extraigan los pólipos  No tome aspirina o realice viajes largos en lauren dentro de los 7 días después de dean procedimiento  Pregunte a dean médico acerca de cualquier otras limitaciones después de dean procedimiento  ¿Cuáles son los riesgos de [place] colonoscopia? Usted podría sentir dolor o sangrar después de que remuevan el endoscopio y los pólipos  Es posible que también tenga latido cardíaco lento, disminución de la presión arterial o aumento de la sudoración  Dean colon podría sufrir un desgarre debido al aumento de presión del endoscopio y de otros instrumentos  [place] podría provocar que el contenido de ashli intestinos se vacíe de dean colon a dean abdomen  Si esto sucede, chetan tendrá [facility] hospital y Maudie Bright cirugía en dean colon  ACUERDOS SOBRE DEAN CUIDADO:   Usted tiene el derecho de ayudar a planear dean cuidado   Fredia Craze todo lo que pueda sobre whittaker condición y luis darle tratamiento  Discuta ashli opciones de tratamiento con ashli médicos para decidir el cuidado que usted desea recibir  Usted siempre tiene el derecho de rechazar el tratamiento  Esta información es sólo para uso en educación  Whittaker intención no es darle un consejo médico sobre enfermedades o tratamientos  Colsulte con whittaker Dawit Stacy farmacéutico antes de seguir cualquier régimen médico para saber si es seguro y efectivo para usted  © 2017 2600 Andrew Gomez Information is for End User's use only and may not be sold, redistributed or otherwise used for commercial purposes  All illustrations and images included in CareNotes® are the copyrighted property of A D A M , Inc  or Julio Cesar Smith  Enfermedad por reflujo gastroesofágico   LO QUE NECESITA SABER:   ¿Qué es la enfermedad por reflujo gastroesofágico?  La enfermedad por reflujo gastroesofágico (Denver Walter) ocurre cuando el ácido y los alimentos en el estómago regresan al esófago  La enfermedad por reflujo gastroesofágico es el reflujo que se produce más de [address] a la semana oscar varias semanas  Generalmente causa acidez y otros síntomas  La ERGE puede causar otros problemas de shamika con el tiempo si no es tratada  ¿Qué provoca la ERGE? La ERGE a menudo ocurre cuando el músculo inferior (esfínter) del esófago no adán amberly  Brandee esfínter normalmente se abre para dejar pasar los alimentos al [facility]  Luego se adán para [employer] y el ácido estomacal dentro del [facility] [place]  Si el esfínter no adán amberly, el ácido y los alimentos regresan (reflujo) al esófago  Lo siguiente podría aumentar whittaker riesgo de la ERGE:  · Ciertos alimentos luis picantes, chocolate, alimentos que contengan cafeína, menta y alimentos fritos      · Korina hernia hiatal    · Ciertos medicamentos luis los antagonistas del calcio (utilizados para tratar la presión arterial cade), medicamentos para la alergia, sedantes o antidepresivos    · Embarazo u obesidad    · Acostarse después de comer    · Beber alcohol o fumar  ¿Cuáles son los signos y síntomas de la ERGE? La acidez es el síntoma más común de la [id number]-[id number]  Usted podría sentir dolor quemante en el pecho o debajo del esternón  [place] ocurre generalmente después de las comidas y se extiende al ansley, a la mandíbula o al hombro  El dolor se karen cuando cambia de posición  Usted también podría presentar alguno de los siguientes:  · Sabor amargo o ácido en dean boca    · Tos seca    · Dificultad para tragar o dolor al tragar    · Ronquera o dolor de garganta    · Eructar o tener hipo con frecuencia    · Sensación de llenura pronto después de empezar a comer  ¿Cómo se diagnostica la ERGE? Dean médico le preguntará acerca de aslhi síntomas y cuándo comenzaron  Infórmele acerca de otras condiciones médicas que tenga, cuáles son ashli hábitos alimenticios y ashli [address]  Es posible que también necesite alguno de los siguientes tratamientos:  · Observación de pH esofágico  se Patriciabury para colocar ko pequeña sonda dentro de dean esófago y estómago para revisar la cantidad de ácido  · Ko endoscopia  es un procedimiento que se [place] para observar dentro de dean esófago y BJURHOLM  Un endoscopio es un tubo flexible con Joao summers y cámara en el extremo  Dean médico podría extraer Justo Anes de tejido y mandarla al laboratorio para exámenes  · Ko radiografía gastrointestinal (GI) superior  se realiza para loan imágenes de dean estómago e intestinos  Es posible que le den a beber un líquido calcáreo antes de que se tomen las imágenes  Brandee líquido [place] a que dean estómago e intestinos se vean mejor en las radiografías  · Manometría esofágica  es un examen que muestra la forma en que dean esófago empuja los alimentos y el líquido hacia dean BJURHOLM  También muestra la presión en dean esófago y estómago   Es probable que también muestre ko hernia hiatal   ¿Cómo se trata Fito Isbell? · Medicamentos,  se utilizan para disminuir el ácido estomacal  Los medicamentos también podrían usarse para ayudar a que dean esfínter esofágico inferior y dean estómago se contraigan (estrechen) más  · Cirugía  se realiza para envolver la parte superior del estómago alrededor del esfínter esofágico  [place] fortalecerá el esfínter y prevendrá el reflujo  ¿Cómo puedo ayudar a prevenir la ERGE?   · No consuma alimentos o bebidas que puedan aumentar la [place]  Estos incluyen chocolate, menta, comidas fritas o grasosas, bebidas que contienen cafeína o bebidas gaseosas  Otros alimentos incluyen comidas picantes, cebollas, tomates y alimentos a base de tomate  No consuma alimentos y bebidas que puedan irritar dean esófago, luis las frutas cítricas, los jugos y las bebidas alcohólicas  · No ingiera comidas abundantes  Cuando usted come [employer] a la vez, dean estómago necesita más ácido para digerirla  Consuma 6 comidas pequeñas al día en vez de 3 comidas grandes y coma lentamente  No consuma alimentos entre 2 y 3 horas antes de WEDGECARRUP  · Eleve la cabecera de dean cama  Coloque bloques de 6 pulgadas debajo de la cabecera de la estructura de dean cama  También podría usar más ko almohada para apoyar dean nathalie y hombros mientras duerme  · Mantenga un peso saludable  Si usted tiene sobrepeso, la pérdida de peso podría ayudar a aliviar los síntomas de la [id number]-cqèw-Lmyxuo  · No fume  Fumar debilita el esfínter esofágico inferior y [place] el riesgo de Bfjckzegb-sdèh-Fhjemq  Pida información a dean médico si usted actualmente fuma y necesita ayuda para dejar de fumar  Los cigarrillos electrónicos o tabaco sin humo todavía contienen nicotina  Consulte con dean médico antes de [employer]  · No use ropa que [place] alrededor de la cintura  La ropa apretada puede ejercer presión [employer] y causar o empeorar los síntomas de la Jpubnldqx-zqèm-Wjutdz  ¿Cuándo cara buscar atención inmediata?    · Usted siente Destiny Saras y no puede eructar o vomitar  · Usted siente dolor wayne en el pecho y dificultad repentina para respirar  · Ashli evacuaciones intestinales son de color ivet, con Megan, o de apariencia alquitranada  · Shook vómito parece luis café molido o contiene adelaida  ¿Cuándo cara comunicarme con mi médico?   · Vomita grandes cantidades, o vomita con frecuencia  · Tiene dificultad para respirar después de vomitar  · Tiene dificultad para tragar o dolor al tragar  · Usted pierde peso sin proponérselo  · Ashli síntomas empeoran o no mejoran con el tratamiento  · Usted tiene preguntas o inquietudes acerca de shook condición o cuidado  ACUERDOS SOBRE SHOOK CUIDADO:   Usted tiene el derecho de ayudar a planear shook cuidado  Aprenda todo lo que pueda sobre shook condición y luis darle tratamiento  Discuta ashli opciones de tratamiento con ashli médicos para decidir el cuidado que usted desea recibir  Usted siempre tiene el derecho de rechazar el tratamiento  Esta información es sólo para uso en educación  Shook intención no es darle un consejo médico sobre enfermedades o tratamientos  Colsulte con shook Saadia Derrek farmacéutico antes de seguir cualquier régimen médico para saber si es seguro y efectivo para usted  © 2017 2600 Andrew Gomez Information is for End User's use only and may not be sold, redistributed or otherwise used for commercial purposes  All illustrations and images included in CareNotes® are the copyrighted property of A D A M , Inc  or Julio Cesar Smith  Enfermedad por reflujo gastroesofágico   LO QUE NECESITA SABER:   ¿Qué es la enfermedad por reflujo gastroesofágico?  La enfermedad por reflujo gastroesofágico (Vertell Fiddler) ocurre cuando el ácido y los alimentos en el estómago regresan al esófago  La enfermedad por reflujo gastroesofágico es el reflujo que se produce más de [address] a la semana oscar varias semanas  Generalmente causa acidez y otros síntomas   La ERGE puede causar otros problemas de shamika con el tiempo si no es tratada  ¿Qué provoca la ERGE? La ERGE a menudo ocurre cuando el músculo inferior (esfínter) del esófago no adán amberly  Brandee esfínter normalmente se abre para dejar pasar los alimentos al BJURHOLM  Luego se adán para [employer] y el ácido estomacal dentro del BJURHOLM  Si el esfínter no adán amberly, el ácido y los alimentos regresan (reflujo) al esófago  Lo siguiente podría aumentar dean riesgo de la ERGE:  · Ciertos alimentos luis picantes, chocolate, alimentos que contengan cafeína, menta y alimentos fritos  · Korina hernia hiatal    · Ciertos medicamentos luis los antagonistas del calcio (utilizados para tratar la presión arterial cade), medicamentos para la alergia, sedantes o antidepresivos    · Embarazo u obesidad    · Acostarse después de comer    · Beber alcohol o fumar  ¿Cuáles son los signos y síntomas de la ERGE? La acidez es el síntoma más común de la Cmakwvfci-zrèz-[id number]  Usted podría sentir dolor quemante en el pecho o debajo del esternón  [place] ocurre generalmente después de las comidas y se extiende al ansley, a la mandíbula o al hombro  El dolor se karen cuando cambia de posición  Usted también podría presentar alguno de los siguientes:  · Sabor amargo o ácido en dean boca    · Tos seca    · Dificultad para tragar o dolor al tragar    · Ronquera o dolor de garganta    · Eructar o tener hipo con frecuencia    · Sensación de llenura pronto después de empezar a comer  ¿Cómo se diagnostica la ERGE? Dean médico le preguntará acerca de ashli síntomas y cuándo comenzaron  Infórmele acerca de otras condiciones médicas que tenga, cuáles son ashli hábitos alimenticios y ashli [address]  Es posible que también necesite alguno de los siguientes tratamientos:  · Observación de pH esofágico  se Patriciabury para colocar korina pequeña sonda dentro de dean esófago y estómago para revisar la cantidad de ácido       · Korina endoscopia  es un procedimiento que se [place] para observar dentro de dean esófago y estómago  Un endoscopio es un tubo flexible con Naomia Little melina y cámara en el extremo  Dean médico podría extraer Ty nye y sanchez al laboratorio para exámenes  · Korina radiografía gastrointestinal (GI) superior  se realiza para loan imágenes de dean estómago e intestinos  Es posible que le den a beber un líquido calcáreo antes de que se tomen las imágenes  Brandee líquido [place] a que dean estómago e intestinos se vean mejor en las radiografías  · Manometría esofágica  es un examen que muestra la forma en que dean esófago empuja los alimentos y el líquido hacia dean BJURHOLM  También muestra la presión en dean esófago y estómago  Es probable que también muestre korina hernia hiatal   ¿Cómo se trata la ERGE? · Medicamentos,  se utilizan para disminuir el ácido estomacal  Los medicamentos también podrían usarse para ayudar a que dean esfínter esofágico inferior y dean estómago se contraigan (estrechen) más  · Cirugía  se realiza para envolver la parte superior del estómago alrededor del esfínter esofágico  [place] fortalecerá el esfínter y prevendrá el reflujo  ¿Cómo puedo ayudar a prevenir la ERGE?   · No consuma alimentos o bebidas que puedan aumentar la [place]  Estos incluyen chocolate, menta, comidas fritas o grasosas, bebidas que contienen cafeína o bebidas gaseosas  Otros alimentos incluyen comidas picantes, cebollas, tomates y alimentos a base de tomate  No consuma alimentos y bebidas que puedan irritar dean esófago, luis las frutas cítricas, los jugos y las bebidas alcohólicas  · No ingiera comidas abundantes  Cuando usted come [employer] a la vez, dean estómago necesita más ácido para digerirla  Consuma 6 comidas pequeñas al día en vez de 3 comidas grandes y coma lentamente  No consuma alimentos entre 2 y 3 horas antes de [place]EDG[place]ARRUP  · Eleve la cabecera de dean cama  Coloque bloques de 6 pulgadas debajo de la cabecera de la estructura de dean cama   También podría usar más korina almohada para apoyar dean [place] y hombros mientras duerme  · Mantenga un peso saludable  Si usted tiene sobrepeso, la pérdida de peso podría ayudar a aliviar los síntomas de la Zmmvsnuqq-vjèl-Hqjxbi  · No fume  Fumar debilita el esfínter esofágico inferior y [place] el riesgo de Cazjrrsuz-crèp-Rahqrv  Pida información a dean médico si usted actualmente fuma y necesita ayuda para dejar de fumar  Los cigarrillos electrónicos o tabaco sin humo todavía contienen nicotina  Consulte con dean médico antes de [employer]  · No use ropa que [place] alrededor de la cintura  La ropa apretada puede ejercer presión [employer] y causar o empeorar los síntomas de la Yhtriuszw-mcèr-Yxvybz  ¿Cuándo cara buscar atención inmediata? · Usted siente Ross Fus y no puede eructar o vomitar  · Usted siente dolor wayne en el pecho y dificultad repentina para respirar  · Ashli evacuaciones intestinales son de color ivet, con [place], o de apariencia alquitranada  · Dean vómito parece luis café molido o contiene adelaida  ¿Cuándo cara comunicarme con mi médico?   · Vomita grandes cantidades, o vomita con frecuencia  · Tiene dificultad para respirar después de vomitar  · Tiene dificultad para tragar o dolor al tragar  · Usted pierde peso sin proponérselo  · Ashli síntomas empeoran o no mejoran con el tratamiento  · Usted tiene preguntas o inquietudes acerca de dean condición o cuidado  ACUERDOS SOBRE DEAN CUIDADO:   Usted tiene el derecho de ayudar a planear dean cuidado  Aprenda todo lo que pueda sobre dean condición y luis darle tratamiento  Discuta ashli opciones de tratamiento con ashli médicos para decidir el cuidado que usted desea recibir  Usted siempre tiene el derecho de rechazar el tratamiento  Esta información es sólo para uso en educación  Dean intención no es darle un consejo médico sobre enfermedades o tratamientos   Colsulte con dean Suzen Hastings farmacéutico antes de seguir cualquier régimen médico para saber si es seguro y [place] para usted   © 2017 2600 Andrew Gomez Information is for End User's use only and may not be sold, redistributed or otherwise used for commercial purposes  All illustrations and images included in CareNotes® are the copyrighted property of A D A M , Inc  or Julio Cesar Smith  Hernia hiatal   LO QUE NECESITA SABER:   ¿Qué es ko hernia hiatal?  Ko hernia hiatal es ko condición que provoca que parte de dean estómago se abulte a través del hiato (apertura pequeña) en dean diafragma  La parte del estómago podría moverse hacia arriba y [place], o podría quedarse atrapado en el diafragma  ¿Qué aumenta mi riesgo de tener ko hernia hiatal?  La causa exacta de ko hernia hiatal es desconocida  Usted pudo santi nacido con un hiato denver  Lo siguiente podría aumentar dean riesgo de ko hernia hiatal:  · Obesidad    · Edad avanzada    · Condiciones médicas luis diverticulosis o esofagitis    · Cirugías del esófago o del estómago previas o trauma luis de un accidente automovilístico  ¿Cuáles son los tipos de hernia hiatal?   · Tipo I (hernia hiatal por deslizamiento): Ko porción del estómago se desliza dentro y fuera del hiato  Brandee tipo es el más común y generalmente provoca la enfermedad por reflujo gastroesofágico (Niall Rist)  El ERGE ocurre cuando el esfínter esofágico no adán apropiadamente y provoca reflujo de ácido  El esfínter esofágico es el músculo más bajo del esófago  · Tipo II (hernia hiatal paraesofágica):  El tipo II de hernia hiatal se forma cuando ko parte del estómago pasa por el hiato y se coloca junto al esófago  · Tipo III (combinada):  El tipo III de hernia hiatal es ko combinación de ko hernia hiatal por deslizamiento y Emmit Pattee paraesofágica  · Tipo IV (hernia hiatal compleja paraesofágica):  El [address], los intestinos reyes y grueso, el bazo, el páncreas o el hígado son empujados hacia arriba dentro de dean pecho    ¿Cuáles son los signos y síntomas de ko hernia hiatal?  La acidez es el síntoma más común de ko hernia hiatal  [place] ocurre generalmente después de las comidas y se extiende al ansley, a la mandíbula o al hombro  Es posible que no presente ningún signo ni síntoma o que tenga alguno de los siguientes:  · Dolor abdominal, especialmente en el área alan arriba del ombligo    · Sabor amargo o ácido en dean boca    · Dificultad para tragar    · Marquis Lown o ronquera    · Dolor en el pecho o falta de aliento que ocurre después de comer    · Eructar o tener hipo con frecuencia    · Sensación de incomodidad o de estar lleno después de comer  ¿Cómo se diagnostica ko hernia hiatal?   · Examen de las series gastrointestinales (GI) superiores  incluye radiografías de dean esófago, estómago y de ashli intestinos delgados  También se conoce luis examen de deglución de bario  A usted le darán bario (líquido calcáreo) para loan antes de que le tomen las imágenes  Brandee líquido [place] a que dean estómago e intestinos se vean mejor en las radiografías  Un examen de las series GI superiores puede mostrar si usted tiene Neela, obstrucción en un intestinos u otros problemas  · Ko endoscopia  o endoscopia esofagogastroduodenal utiliza un endoscopio para observar en el interior de dean tracto digestivo  Un endoscopio es un tubo srinivasa y flexible con ko melina en el extremo  Es posible que se conecte ko cámara al endoscopio para loan imágenes  ¿Cómo se trata ko hernia hiatal?  El tratamiento depende del tipo de hernia hiatal que usted tenga y de ashli síntomas  Es posible que no necesite ningún tratamiento  Es posible que usted necesite alguno de los siguientes:  · Medicamentos,  podrían administrase para aliviar los síntomas de la [place]   Estos medicamentos ayudan a disminuir u obstruir el ácido estomacal  Es posible que también le den medicamentos ayudan a hacer más estrecho el esfínter esofágico     · Cirugía  podría realizarse cuando los medicamentos no puedan controlar ashli síntomas u otros problemas están presentes  Dean médico también podría sugerir la cirugía dependiendo del tipo de hernia que usted tenga  Dean médico puede poner dean estómago en la posición normal  Él podría hacer el hiato (agujero) más pequeño y atar dean estómago a dean abdomen  La funduplicatura es ko cirugía en donde la parte superior del estómago se enrolla alrededor del esfínter esofágico para reforzarlo  ¿Cómo puedo controlar mis síntomas? La siguiente nutrición y cambios en el estilo de deena podrían se recomendados para aliviar aniyah síntomas de [place]  · Evite los alimentos que empeoran aniyah síntomas  Estos podrían [employer], jugos de fruta, alcohol, cafeína, chocolate y [place]  · Coma porciones pequeñas oscar el día  Las porciones [employer] dan a dean estómago menos alimentos que digerir  · Evite acostarse e inclinarse después de comer  No consuma alimentos entre 2 y 3 horas antes de [place] disminuye dean riesgo de reflujo  · Mantenga un peso saludable  Si usted tiene sobrepeso, la pérdida de peso podría ayudarle a [employer]  · Duerma con dean nathalie elevada  al menos 6 pulgadas  · No fume  El fumar puede aumentar aniyah síntomas de [place]  ¿Cuándo cara buscar atención inmediata? · Usted tiene dolor abdominal intenso  · Usted trata de vomitar patrick no sale nada  · Usted siente dolor wayne en el pecho y dificultad repentina para respirar  · Aniyah heces son negras o tienen adelaida  · Dean vómito parece luis café molido o contiene adelaida  ¿Cuándo cara comunicarme con mi médico?   · Aniyah síntomas están empeorando  · Usted tiene náusea y está vomitando  · Usted pierde peso sin proponérselo  · Usted tiene preguntas o inquietudes acerca de dean condición o cuidado  ACUERDOS SOBRE DEAN CUIDADO:   Usted tiene el derecho de ayudar a planear dean cuidado  Aprenda todo lo que pueda sobre dean condición y luis darle tratamiento   Discuta aniyah opciones de tratamiento con ashli médicos para decidir el cuidado que usted desea recibir  Usted siempre tiene el derecho de rechazar el tratamiento  Esta información es sólo para uso en educación  Dean intención no es darle un consejo médico sobre enfermedades o tratamientos  Colsulte con dean Carollee Lovings farmacéutico antes de seguir cualquier régimen médico para saber si es seguro y efectivo para usted  © 2017 [address]  Information is for End User's use only and may not be sold, redistributed or otherwise used for commercial purposes  All illustrations and images included in CareNotes® are the copyrighted property of [employer]  or Julio Cesar Smith

## 2018-08-28 NOTE — ANESTHESIA POSTPROCEDURE EVALUATION
Post-Op Assessment Note      CV Status:  Stable    Mental Status:  Alert and awake    Hydration Status:  Euvolemic    PONV Controlled:  Controlled    Airway Patency:  Patent    Post Op Vitals Reviewed:  Yes              BP      Temp      Pulse     Resp      SpO2 Problem: Goal Outcome Summary  Goal: Goal Outcome Summary  OT: Per chart review, patient requires A with all ADLs at baseline. Lives in LTC. Noted patient to return there on discharge. OT eval not appropriate.

## 2018-09-10 ENCOUNTER — OFFICE VISIT (OUTPATIENT)
Dept: GASTROENTEROLOGY | Facility: MEDICAL CENTER | Age: 63
End: 2018-09-10
Payer: COMMERCIAL

## 2018-09-10 VITALS
SYSTOLIC BLOOD PRESSURE: 122 MMHG | DIASTOLIC BLOOD PRESSURE: 78 MMHG | HEART RATE: 61 BPM | BODY MASS INDEX: 21.85 KG/M2 | HEIGHT: 64 IN | WEIGHT: 128 LBS | TEMPERATURE: 97.6 F

## 2018-09-10 DIAGNOSIS — K21.9 GASTROESOPHAGEAL REFLUX DISEASE WITHOUT ESOPHAGITIS: Primary | ICD-10-CM

## 2018-09-10 DIAGNOSIS — R13.19 ESOPHAGEAL DYSPHAGIA: ICD-10-CM

## 2018-09-10 DIAGNOSIS — R10.84 GENERALIZED ABDOMINAL PAIN: ICD-10-CM

## 2018-09-10 DIAGNOSIS — K59.00 CONSTIPATION, UNSPECIFIED CONSTIPATION TYPE: ICD-10-CM

## 2018-09-10 DIAGNOSIS — Z86.010 HISTORY OF COLON POLYPS: ICD-10-CM

## 2018-09-10 PROCEDURE — 99214 OFFICE O/P EST MOD 30 MIN: CPT | Performed by: INTERNAL MEDICINE

## 2018-09-10 RX ORDER — POLYETHYLENE GLYCOL 3350 17 G/17G
17 POWDER, FOR SOLUTION ORAL DAILY
COMMUNITY

## 2018-09-10 NOTE — LETTER
September 10, 2018     Luis Loyola MD  [address] [place]  [address]  [address] [place] [address]    Patient: Prakash Organ   YOB: 1955   Date of Visit: 9/10/2018       Dear Dr Camilla Sheets: Thank you for referring Prakash Organ to me for evaluation  Below are my notes for this consultation  If you have questions, please do not hesitate to call me  I look forward to following your patient along with you  Sincerely,        Maki Calloway MD        CC: No Recipients  Maki Calloway MD  9/10/2018  5:36 PM  Sign at close encounter  [facility] - Outpatient Follow-up Note  Prakash Organ 70 y o  female MRN: [id number]  Encounter: [id number]          ASSESSMENT AND PLAN:      1  Gastroesophageal reflux disease without esophagitis  2  Esophageal dysphagia  She has a small sliding hiatal hernia but her reflux and dysphagia symptoms seem to have resolved  He can continue with diet and lifestyle modifications and could take a proton pump inhibitor as needed  3  Generalized abdominal pain  4  Constipation, unspecified constipation type  I will start her on Linzess since the MiraLax helped the constipation but not the bloating  I gave her samples for both the 290 mcg dose of Linzess in the 145 mcg dose  I encouraged her to take the Linzess half an hour before her first meal of the day  I also encouraged her to continue a low FODMAP diet  5  History of colon polyps  She has a history of colon polyps so her next colonoscopy should be in five years or sooner if clinically indicated     ______________________________________________________________________    SUBJECTIVE:  She presents for follow-up after her recent upper endoscopy and colonoscopy for reflux, dysphagia constipation, abdominal pain, and history of colon polyps  Her colon and terminal ileum were normal and her upper endoscopy only revealed a small hiatal hernia  Random biopsies were all negative    She continues to complain of bloating and gas and flatulence symptoms but her reflux and dysphagia have improved  She denies any new symptoms such as bleeding or weight loss  REVIEW OF SYSTEMS IS OTHERWISE NEGATIVE  Historical Information   Past Medical History:   Diagnosis Date    Colon polyp     Frequent headaches      Past Surgical History:   Procedure Laterality Date     SECTION      EGD AND COLONOSCOPY      UT ESOPHAGOGASTRODUODENOSCOPY TRANSORAL DIAGNOSTIC N/A 2018    Procedure: EGD AND COLONOSCOPY;  Surgeon: Nhan Patel MD;  Location[facility] GI [facility]; Service: Gastroenterology     Social History   History   Alcohol Use    Yes     History   Drug Use No     History   Smoking Status    Never Smoker   Smokeless Tobacco    Never Used     No family history on file  Meds/Allergies       Current Outpatient Prescriptions:     cyclobenzaprine (FLEXERIL) 5 mg tablet    polyethylene glycol (MIRALAX) 17 g packet    No Known Allergies        Objective     Blood pressure 122/78, pulse 61, temperature 97 6 °F (36 4 °C), temperature source Tympanic, height 5' 4" (1 626 m), weight 58 1 kg (128 lb)  Body mass index is 21 97 kg/m²  PHYSICAL EXAM:      General Appearance:   Alert, cooperative, no distress   HEENT:   Normocephalic, atraumatic, anicteric      Neck:  Supple, symmetrical, trachea midline   Lungs:   Clear to auscultation bilaterally; no rales, rhonchi or wheezing; respirations unlabored    Heart[de-identified]   Regular rate and rhythm; no murmur, rub, or gallop  Abdomen:   Soft, mild lower abdominal tenderness, non-distended; normal bowel sounds; no masses, no organomegaly    Genitalia:   Deferred    Rectal:   Deferred    Extremities:  No cyanosis, clubbing or edema    Pulses:  2+ and symmetric    Skin:  No jaundice, rashes, or lesions    Lymph nodes:  No palpable cervical lymphadenopathy        Lab Results:   No visits with results within 1 Day(s) from this visit     Latest known visit with results is:   Admission on 08/28/2018, Discharged on 08/28/2018   Component Date Value    Case Report 08/28/2018                      Value:Surgical Pathology Report                         Case: [id number]                                   Authorizing Provider:  Rosi Anaya MD           Collected:           08/28/2018 0911              [facility]        Received:            08/28/2018 [address] [place] [address] Endoscopy                                                     Pathologist:           Grace Santos MD                                                         Specimen:    Esophagus, esophagus r/o eosinophil esophagitis                                            Final Diagnosis 08/28/2018                      Value: This result contains rich text formatting which cannot be displayed here   Additional Information 08/28/2018                      Value: This result contains rich text formatting which cannot be displayed here  Gagandeep Licea Description 08/28/2018                      Value: This result contains rich text formatting which cannot be displayed here  Radiology Results:   No results found

## 2018-09-10 NOTE — PROGRESS NOTES
[facility] - Outpatient Follow-up Note  KALA MOISE 70 y o  female MRN: [id number]  Encounter: [id number]          ASSESSMENT AND PLAN:      1  Gastroesophageal reflux disease without esophagitis  2  Esophageal dysphagia  She has a small sliding hiatal hernia but her reflux and dysphagia symptoms seem to have resolved  He can continue with diet and lifestyle modifications and could take a proton pump inhibitor as needed  3  Generalized abdominal pain  4  Constipation, unspecified constipation type  I will start her on Linzess since the MiraLax helped the constipation but not the bloating  I gave her samples for both the 290 mcg dose of Linzess in the 145 mcg dose  I encouraged her to take the Linzess half an hour before her first meal of the day  I also encouraged her to continue a low FODMAP diet  5  History of colon polyps  She has a history of colon polyps so her next colonoscopy should be in five years or sooner if clinically indicated     ______________________________________________________________________    SUBJECTIVE:  She presents for follow-up after her recent upper endoscopy and colonoscopy for reflux, dysphagia constipation, abdominal pain, and history of colon polyps  Her colon and terminal ileum were normal and her upper endoscopy only revealed a small hiatal hernia  Random biopsies were all negative  She continues to complain of bloating and gas and flatulence symptoms but her reflux and dysphagia have improved  She denies any new symptoms such as bleeding or weight loss  REVIEW OF SYSTEMS IS OTHERWISE NEGATIVE        Historical Information   Past Medical History:   Diagnosis Date    Colon polyp     Frequent headaches      Past Surgical History:   Procedure Laterality Date     SECTION      EGD AND COLONOSCOPY      SD ESOPHAGOGASTRODUODENOSCOPY TRANSORAL DIAGNOSTIC N/A 2018    Procedure: EGD AND COLONOSCOPY;  Surgeon: Nhan Patel MD;  Location: [facility] GI LAB; Service: Gastroenterology     Social History   History   Alcohol Use    Yes     History   Drug Use No     History   Smoking Status    Never Smoker   Smokeless Tobacco    Never Used     No family history on file  Meds/Allergies       Current Outpatient Prescriptions:     cyclobenzaprine (FLEXERIL) 5 mg tablet    polyethylene glycol (MIRALAX) 17 g packet    No Known Allergies        Objective     Blood pressure 122/78, pulse 61, temperature 97 6 °F (36 4 °C), temperature source Tympanic, height 5' 4" (1 626 m), weight 58 1 kg (128 lb)  Body mass index is 21 97 kg/m²  PHYSICAL EXAM:      General Appearance:   Alert, cooperative, no distress   HEENT:   Normocephalic, atraumatic, anicteric      Neck:  Supple, symmetrical, trachea midline   Lungs:   Clear to auscultation bilaterally; no rales, rhonchi or wheezing; respirations unlabored    Heart[de-identified]   Regular rate and rhythm; no murmur, rub, or gallop  Abdomen:   Soft, mild lower abdominal tenderness, non-distended; normal bowel sounds; no masses, no organomegaly    Genitalia:   Deferred    Rectal:   Deferred    Extremities:  No cyanosis, clubbing or edema    Pulses:  2+ and symmetric    Skin:  No jaundice, rashes, or lesions    Lymph nodes:  No palpable cervical lymphadenopathy        Lab Results:   No visits with results within 1 Day(s) from this visit     Latest known visit with results is:   Admission on 08/28/2018, Discharged on 08/28/2018   Component Date Value    Case Report 08/28/2018                      Value:Surgical Pathology Report                         Case: [id number]                                   Authorizing Provider:  Epifanio Parker MD           Collected:           08/28/2018 0911              Order[facility]        Received:            08/28/2018 [address] Endoscopy                                                     Pathologist: Grace Santos MD                                                         Specimen:    Esophagus, esophagus r/o eosinophil esophagitis                                            Final Diagnosis 08/28/2018                      Value: This result contains rich text formatting which cannot be displayed here   Additional Information 08/28/2018                      Value: This result contains rich text formatting which cannot be displayed here  Gagandeep Skaggs 08/28/2018                      Value: This result contains rich text formatting which cannot be displayed here  Radiology Results:   No results found

## 2018-11-23 NOTE — PROGRESS NOTES
Assessment/Plan:    No problem-specific Assessment & Plan notes found for this encounter  Diagnoses and all orders for this visit:    Essential hypertension  Refuses medicine    Acute pain of right knee  -     meloxicam (MOBIC) 7 5 mg tablet; Take 1 tablet (7 5 mg total) by mouth daily    FU 6 mo + labs        Subjective:   Pt here for follow up visit  Labs done in 2018  Pt complaining of tendon pain on right leg for about 2 months when bending  Pt not sure about prevnar 13 vaccine? ??  Pt needs AWV for next visit, will mail out Yi version when available     Patient ID: Kilo Butler is a 70 y o  female  HPI   PHQ-9 Depression Screening    PHQ-9:    Frequency of the following problems over the past two weeks:       Little interest or pleasure in doing things:  0 - not at all  Feeling down, depressed, or hopeless:  1 - several days  Trouble falling or staying asleep, or sleeping too much:  1 - several days  Feeling tired or having little energy:  2 - more than half the days  Poor appetite or overeatin - not at all  Feeling bad about yourself - or that you are a failure or have let yourself or your family down:  0 - not at all  Trouble concentrating on things, such as reading the newspaper or watching television:  0 - not at all  Moving or speaking so slowly that other people could have noticed  Or the opposite - being so fidgety or restless that you have been moving around a lot more than usual:  0 - not at all  Thoughts that you would be better off dead, or of hurting yourself in some way:  0 - not at all  PHQ-2 Score:  1  PHQ-9 Score:  4       Pt presents for 6 months follow up     Pt states she has been having pain in the back on her right knee, she states when she wakes up she gets pain when she bends it  For 2 month  No injury  She works in a factory  She used to take BP meds before  Her BP is slightly elevated today  She states its because of stress     She states she monitors at home and 120/80    The following portions of the patient's history were reviewed and updated as appropriate: allergies, current medications, past family history, past medical history, past social history, past surgical history and problem list     Review of Systems   Constitutional: Negative for activity change and appetite change  Respiratory: Negative  Cardiovascular: Negative  Gastrointestinal: Negative  Genitourinary: Negative  Musculoskeletal: Positive for arthralgias  Skin: Negative for rash  Psychiatric/Behavioral: Negative  Objective:      /86   Pulse 68   Ht 4' 10 66" (1 49 m)   Wt 58 1 kg (128 lb)   SpO2 97%   BMI 26 15 kg/m²   Rechecked myself 150/90       Physical Exam   Constitutional: She is oriented to person, place, and time  She appears well-developed and well-nourished  No distress  HENT:   Head: Normocephalic  Eyes: Conjunctivae are normal    Cardiovascular: Normal rate, regular rhythm and normal heart sounds  Pulmonary/Chest: Effort normal and breath sounds normal  No respiratory distress  She has no wheezes  She has no rales  Musculoskeletal: She exhibits no edema  Right knee: Normal         Left knee: Normal    Neurological: She is alert and oriented to person, place, and time  Psychiatric: She has a normal mood and affect   Her behavior is normal

## 2018-11-26 ENCOUNTER — OFFICE VISIT (OUTPATIENT)
Dept: FAMILY MEDICINE CLINIC | Facility: CLINIC | Age: 63
End: 2018-11-26
Payer: COMMERCIAL

## 2018-11-26 VITALS
HEART RATE: 68 BPM | SYSTOLIC BLOOD PRESSURE: 152 MMHG | BODY MASS INDEX: 25.8 KG/M2 | DIASTOLIC BLOOD PRESSURE: 86 MMHG | HEIGHT: 59 IN | WEIGHT: 128 LBS | OXYGEN SATURATION: 97 %

## 2018-11-26 DIAGNOSIS — M25.561 ACUTE PAIN OF RIGHT KNEE: ICD-10-CM

## 2018-11-26 DIAGNOSIS — I10 ESSENTIAL HYPERTENSION: Primary | ICD-10-CM

## 2018-11-26 PROBLEM — R10.84 GENERALIZED ABDOMINAL PAIN: Status: RESOLVED | Noted: 2018-07-06 | Resolved: 2018-11-26

## 2018-11-26 PROCEDURE — 99214 OFFICE O/P EST MOD 30 MIN: CPT | Performed by: FAMILY MEDICINE

## 2018-11-26 RX ORDER — MELOXICAM 7.5 MG/1
7.5 TABLET ORAL DAILY
Qty: 30 TABLET | Refills: 0 | Status: SHIPPED | OUTPATIENT
Start: 2018-11-26 | End: 2019-03-18 | Stop reason: HOSPADM

## 2019-03-15 NOTE — PROGRESS NOTES
Assessment/Plan:    Chronic pain of right knee  Patient reports right posterior leg pain with occasional radiation down the right leg  Will check a venous doppler to rule out a vascular issue, such as DVT  The pain may be due to a tendonitis, so will try a course of steroids  Patient concerned about taking uncoated tablets, so an injection of DepoMedrol was provided  Patient has taken meloxicam and cyclobenzaprine in the past for this pain with no relief of symptoms  Patient is referred to orthopedics for further evaluation and management of this chronic right knee pain  Diagnoses and all orders for this visit:    Chronic pain of right knee  -     Ambulatory referral to Orthopedic Surgery; Future  -     methylPREDNISolone acetate (DEPO-MEDROL) injection 40 mg    Right leg pain  -     VAS lower limb venous duplex study, unilateral/limited; Future          Subjective: Patient complain of pain behind the right knee for about 5 months    She has been having a escoto time sleeping due to the pain      Patient ID: Cassandra Newman is a 75 y o  female  Knee Pain    Incident onset: Pain for 5 months; no history of trauma  There was no injury mechanism  The pain is present in the right knee (posterior)  The quality of the pain is described as aching  The pain is moderate  The pain has been intermittent since onset  Pertinent negatives include no inability to bear weight, loss of motion or muscle weakness  The symptoms are aggravated by movement (worse when bending the knee)  She has tried NSAIDs (muscle relaxer) for the symptoms  The treatment provided no relief  The following portions of the patient's history were reviewed and updated as appropriate: allergies, current medications, past family history, past medical history, past social history, past surgical history and problem list     Review of Systems   Constitutional: Negative  HENT: Negative  Eyes: Negative  Respiratory: Negative  Cardiovascular: Negative  Gastrointestinal: Negative  Genitourinary: Negative  Musculoskeletal: Positive for joint swelling (posterior right knee--pain with bending)  Negative for gait problem (able to walk without pain)  Skin: Negative  Neurological: Negative  Hematological: Negative  Psychiatric/Behavioral: Negative  Objective:      /80 (BP Location: Left arm, Patient Position: Sitting, Cuff Size: Adult)   Pulse 103   Temp 98 2 °F (36 8 °C) (Oral)   Resp 18   Ht 5' 1" (1 549 m)   Wt 58 2 kg (128 lb 6 4 oz)   SpO2 98%   BMI 24 26 kg/m²          Physical Exam   Constitutional: She is oriented to person, place, and time  Vital signs are normal  She appears well-developed and well-nourished  She is cooperative  HENT:   Head: Normocephalic and atraumatic  Right Ear: Hearing and external ear normal    Left Ear: Hearing and external ear normal    Eyes: Conjunctivae and lids are normal    Pulmonary/Chest: Effort normal    Musculoskeletal:        Right knee: She exhibits normal range of motion, no swelling, no effusion, no ecchymosis, no deformity, no erythema and no bony tenderness  No tenderness found  No medial joint line, no lateral joint line, no MCL, no LCL and no patellar tendon tenderness noted  Right ankle: She exhibits no swelling  Legs:  Neurological: She is alert and oriented to person, place, and time  Skin: Skin is warm and dry  Psychiatric: She has a normal mood and affect  Her speech is normal and behavior is normal    Nursing note and vitals reviewed

## 2019-03-18 ENCOUNTER — OFFICE VISIT (OUTPATIENT)
Dept: FAMILY MEDICINE CLINIC | Facility: CLINIC | Age: 64
End: 2019-03-18

## 2019-03-18 VITALS
RESPIRATION RATE: 18 BRPM | DIASTOLIC BLOOD PRESSURE: 80 MMHG | HEIGHT: 61 IN | OXYGEN SATURATION: 98 % | TEMPERATURE: 98.2 F | BODY MASS INDEX: 24.24 KG/M2 | HEART RATE: 103 BPM | WEIGHT: 128.4 LBS | SYSTOLIC BLOOD PRESSURE: 140 MMHG

## 2019-03-18 DIAGNOSIS — M25.561 CHRONIC PAIN OF RIGHT KNEE: Primary | ICD-10-CM

## 2019-03-18 DIAGNOSIS — M79.604 RIGHT LEG PAIN: ICD-10-CM

## 2019-03-18 DIAGNOSIS — G89.29 CHRONIC PAIN OF RIGHT KNEE: Primary | ICD-10-CM

## 2019-03-18 PROBLEM — Z12.11 ENCOUNTER FOR SCREENING COLONOSCOPY: Status: RESOLVED | Noted: 2018-05-24 | Resolved: 2019-03-18

## 2019-03-18 PROCEDURE — 99214 OFFICE O/P EST MOD 30 MIN: CPT | Performed by: FAMILY MEDICINE

## 2019-03-18 RX ORDER — METHYLPREDNISOLONE ACETATE 40 MG/ML
40 INJECTION, SUSPENSION INTRA-ARTICULAR; INTRALESIONAL; INTRAMUSCULAR; SOFT TISSUE ONCE
Status: COMPLETED | OUTPATIENT
Start: 2019-03-18 | End: 2019-03-18

## 2019-03-18 RX ADMIN — METHYLPREDNISOLONE ACETATE 40 MG: 40 INJECTION, SUSPENSION INTRA-ARTICULAR; INTRALESIONAL; INTRAMUSCULAR; SOFT TISSUE at 16:38

## 2019-03-18 NOTE — ASSESSMENT & PLAN NOTE
Patient reports right posterior leg pain with occasional radiation down the right leg  Will check a venous doppler to rule out a vascular issue, such as DVT  The pain may be due to a tendonitis, so will try a course of steroids  Patient concerned about taking uncoated tablets, so an injection of DepoMedrol was provided  Patient has taken meloxicam and cyclobenzaprine in the past for this pain with no relief of symptoms  Patient is referred to orthopedics for further evaluation and management of this chronic right knee pain

## 2023-07-26 ENCOUNTER — TRANSCRIBE ORDERS (OUTPATIENT)
Dept: GASTROENTEROLOGY | Facility: CLINIC | Age: 68
End: 2023-07-26

## 2024-11-22 ENCOUNTER — TELEPHONE (OUTPATIENT)
Age: 69
End: 2024-11-22

## 2024-11-22 NOTE — TELEPHONE ENCOUNTER
Patients GI provider:  Dr. Salinas    Number to return call: [phone]    Reason for call: Pt called in requesting to have her records sent over to her new doctor as she moved to [place]. Provided her with [facility] number and transferred her over as well along with the . No further questions.     Scheduled procedure/appointment date if applicable: Apt/procedure N/A